# Patient Record
Sex: FEMALE | Race: BLACK OR AFRICAN AMERICAN | NOT HISPANIC OR LATINO | Employment: FULL TIME | URBAN - METROPOLITAN AREA
[De-identification: names, ages, dates, MRNs, and addresses within clinical notes are randomized per-mention and may not be internally consistent; named-entity substitution may affect disease eponyms.]

---

## 2024-07-05 ENCOUNTER — OFFICE VISIT (OUTPATIENT)
Dept: OBSTETRICS AND GYNECOLOGY | Facility: CLINIC | Age: 29
End: 2024-07-05
Payer: COMMERCIAL

## 2024-07-05 VITALS
DIASTOLIC BLOOD PRESSURE: 75 MMHG | HEIGHT: 62 IN | SYSTOLIC BLOOD PRESSURE: 107 MMHG | WEIGHT: 142 LBS | BODY MASS INDEX: 26.13 KG/M2

## 2024-07-05 DIAGNOSIS — Z12.4 ENCOUNTER FOR SCREENING FOR CERVICAL CANCER: Primary | ICD-10-CM

## 2024-07-05 DIAGNOSIS — Z87.410 HISTORY OF CERVICAL DYSPLASIA: ICD-10-CM

## 2024-07-05 PROCEDURE — 88175 CYTOPATH C/V AUTO FLUID REDO: CPT | Performed by: OBSTETRICS & GYNECOLOGY

## 2024-07-05 PROCEDURE — 99999 PR PBB SHADOW E&M-NEW PATIENT-LVL III: CPT | Mod: PBBFAC,,, | Performed by: OBSTETRICS & GYNECOLOGY

## 2024-07-05 RX ORDER — CLOBETASOL PROPIONATE 0.5 MG/G
CREAM TOPICAL
COMMUNITY

## 2024-07-05 RX ORDER — ERGOCALCIFEROL 1.25 MG/1
CAPSULE ORAL
COMMUNITY

## 2024-07-05 NOTE — PROGRESS NOTES
HISTORY OF PRESENT ILLNESS:    Mya Carlisle is a 28 y.o. 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    Sexually transmitted infection risk: very low risk of STD exposure.   Menstrual flow: regular every 28-30 days.  This is the extent of the patient's complaints at this time.     Past Medical History:   Diagnosis Date    Abnormal Pap smear of cervix     Carrier of ureaplasma urealyticum     Low vitamin D level        Past Surgical History:   Procedure Laterality Date    CERVICAL BIOPSY  W/ LOOP ELECTRODE EXCISION      WRIST FRACTURE SURGERY Right 2017       MEDICATIONS AND ALLERGIES:      Current Outpatient Medications:     clobetasoL (TEMOVATE) 0.05 % cream, APPLY THIN COAT TO AFFECTED AREA TWICE A DAY, Disp: , Rfl:     ergocalciferol (ERGOCALCIFEROL) 50,000 unit Cap, TAKE 1 CAPSULE EVERY WEEK BY ORAL ROUTE, FOR VITAMIN D DEF., Disp: , Rfl:     Review of patient's allergies indicates:  No Known Allergies    Family History   Problem Relation Name Age of Onset    Breast cancer Neg Hx      Colon cancer Neg Hx      Ovarian cancer Neg Hx         Social History     Socioeconomic History    Marital status: Single   Tobacco Use    Smoking status: Never    Smokeless tobacco: Never   Substance and Sexual Activity    Alcohol use: Yes     Comment: occ    Drug use: Never    Sexual activity: Not Currently     Partners: Male     Birth control/protection: Condom       OB History    Para Term  AB Living   1       1     SAB IAB Ectopic Multiple Live Births     1            # Outcome Date GA Lbr Issa/2nd Weight Sex Type Anes PTL Lv   1 IAB 09/15/94                  COMPREHENSIVE GYN HISTORY:  PAP History: Denies abnormal Paps.  Infection History: Denies STDs. Denies PID.  Benign History: Denies uterine fibroids. Denies ovarian cysts. Denies  "endometriosis. Denies other conditions.  Cancer History: Denies cervical cancer. Denies uterine cancer or hyperplasia. Denies ovarian cancer. Denies vulvar cancer or pre-cancer. Denies vaginal cancer or pre-cancer. Denies breast cancer. Denies colon cancer.      ROS:  GENERAL: No weight changes. No swelling. No fatigue. No fever.  BREASTS: No pain. No lumps. No discharge.  ABDOMEN: No pain. No nausea. No vomiting. No diarrhea. No constipation.  REPRODUCTIVE: No abnormal bleeding. No pelvic pain.   VULVA: No pain. No lesions. No itching.  VAGINA: No relaxation. No itching. No odor. No discharge. No lesions.  URINARY: No incontinence. No nocturia. No frequency. No dysuria.    /75 (BP Location: Right arm, Patient Position: Sitting)   Ht 5' 2" (1.575 m)   Wt 64.4 kg (141 lb 15.6 oz)   LMP 07/01/2024   BMI 25.97 kg/m²     PE:  Physical Exam   Abdomen-soft nt   V/v wnl , pap repeated   PROCEDURES/ORDERS:  Pap      Assessment/Plan:    Encounter for screening for cervical cancer  -     Liquid-Based Pap Smear, Screening    History of cervical dysplasia        Orders Placed This Encounter    Liquid-Based Pap Smear, Screening        COUNSELING:  The patient was counseled today on:  -A.C.S. Pap and pelvic exam guidelines, recomendations for yearly mammogram, monthly self breast exams and to follow up with her PCP for other health maintenance.    FOLLOW-UP  annually for [employer].     "

## 2024-07-06 LAB
CLINICAL INFO: NORMAL
DATE OF PREVIOUS PAP: NO
DATE PREVIOUS BX: NO
LMP START DATE: NORMAL
SPECIMEN SOURCE CVX/VAG CYTO: NORMAL

## 2024-10-30 ENCOUNTER — PATIENT MESSAGE (OUTPATIENT)
Dept: OBSTETRICS AND GYNECOLOGY | Facility: CLINIC | Age: 29
End: 2024-10-30
Payer: COMMERCIAL